# Patient Record
Sex: FEMALE | Race: WHITE | URBAN - METROPOLITAN AREA
[De-identification: names, ages, dates, MRNs, and addresses within clinical notes are randomized per-mention and may not be internally consistent; named-entity substitution may affect disease eponyms.]

---

## 2021-12-21 LAB
C. TRACHOMATIS, EXTERNAL RESULT: NEGATIVE
N. GONORRHOEAE, EXTERNAL RESULT: NEGATIVE

## 2022-01-17 LAB
ABO, EXTERNAL RESULT: NORMAL
GBS, EXTERNAL RESULT: NEGATIVE
HEP B, EXTERNAL RESULT: NEGATIVE
HIV, EXTERNAL RESULT: NEGATIVE
RH FACTOR, EXTERNAL RESULT: POSITIVE
RPR, EXTERNAL RESULT: NON REACTIVE
RUBELLA TITER, EXTERNAL RESULT: NORMAL

## 2022-08-15 ENCOUNTER — HOSPITAL ENCOUNTER (INPATIENT)
Age: 22
LOS: 2 days | Discharge: HOME | End: 2022-08-17
Attending: STUDENT IN AN ORGANIZED HEALTH CARE EDUCATION/TRAINING PROGRAM | Admitting: STUDENT IN AN ORGANIZED HEALTH CARE EDUCATION/TRAINING PROGRAM
Payer: COMMERCIAL

## 2022-08-15 ENCOUNTER — ANESTHESIA (OUTPATIENT)
Dept: LABOR AND DELIVERY | Age: 22
End: 2022-08-15
Payer: COMMERCIAL

## 2022-08-15 ENCOUNTER — ANESTHESIA EVENT (OUTPATIENT)
Dept: LABOR AND DELIVERY | Age: 22
End: 2022-08-15
Payer: COMMERCIAL

## 2022-08-15 PROBLEM — Z37.9 NORMAL LABOR: Status: ACTIVE | Noted: 2022-08-15

## 2022-08-15 LAB
A/G RATIO: 1.1 (ref 1.1–2.2)
ABO/RH: NORMAL
ALBUMIN SERPL-MCNC: 3.4 G/DL (ref 3.4–5)
ALP BLD-CCNC: 143 U/L (ref 40–129)
ALT SERPL-CCNC: <5 U/L (ref 10–40)
AMPHETAMINE SCREEN, URINE: NORMAL
ANION GAP SERPL CALCULATED.3IONS-SCNC: 13 MMOL/L (ref 3–16)
ANTIBODY SCREEN: NORMAL
APTT: 32.3 SEC (ref 23–34.3)
AST SERPL-CCNC: 11 U/L (ref 15–37)
BACTERIA: ABNORMAL /HPF
BARBITURATE SCREEN URINE: NORMAL
BASOPHILS ABSOLUTE: 0 K/UL (ref 0–0.2)
BASOPHILS RELATIVE PERCENT: 0.2 %
BENZODIAZEPINE SCREEN, URINE: NORMAL
BILIRUB SERPL-MCNC: <0.2 MG/DL (ref 0–1)
BILIRUBIN URINE: NEGATIVE
BLOOD, URINE: ABNORMAL
BUN BLDV-MCNC: 5 MG/DL (ref 7–20)
BUPRENORPHINE URINE: NORMAL
CALCIUM SERPL-MCNC: 10.4 MG/DL (ref 8.3–10.6)
CANNABINOID SCREEN URINE: NORMAL
CHLORIDE BLD-SCNC: 105 MMOL/L (ref 99–110)
CLARITY: CLEAR
CO2: 20 MMOL/L (ref 21–32)
COCAINE METABOLITE SCREEN URINE: NORMAL
COLOR: YELLOW
CREAT SERPL-MCNC: 0.6 MG/DL (ref 0.6–1.1)
CREATININE URINE: 41.7 MG/DL (ref 28–259)
EOSINOPHILS ABSOLUTE: 0.1 K/UL (ref 0–0.6)
EOSINOPHILS RELATIVE PERCENT: 0.4 %
EPITHELIAL CELLS, UA: ABNORMAL /HPF (ref 0–5)
FIBRINOGEN: 529 MG/DL (ref 207–509)
GFR AFRICAN AMERICAN: >60
GFR NON-AFRICAN AMERICAN: >60
GLUCOSE BLD-MCNC: 106 MG/DL (ref 70–99)
GLUCOSE URINE: NEGATIVE MG/DL
HCT VFR BLD CALC: 31.2 % (ref 36–48)
HEMOGLOBIN: 10.4 G/DL (ref 12–16)
INR BLD: 0.99 (ref 0.87–1.14)
KETONES, URINE: NEGATIVE MG/DL
LEUKOCYTE ESTERASE, URINE: NEGATIVE
LYMPHOCYTES ABSOLUTE: 1.9 K/UL (ref 1–5.1)
LYMPHOCYTES RELATIVE PERCENT: 12.4 %
Lab: NORMAL
MCH RBC QN AUTO: 26.8 PG (ref 26–34)
MCHC RBC AUTO-ENTMCNC: 33.3 G/DL (ref 31–36)
MCV RBC AUTO: 80.3 FL (ref 80–100)
METHADONE SCREEN, URINE: NORMAL
MICROSCOPIC EXAMINATION: YES
MONOCYTES ABSOLUTE: 1.1 K/UL (ref 0–1.3)
MONOCYTES RELATIVE PERCENT: 6.8 %
NEUTROPHILS ABSOLUTE: 12.6 K/UL (ref 1.7–7.7)
NEUTROPHILS RELATIVE PERCENT: 80.2 %
NITRITE, URINE: NEGATIVE
OPIATE SCREEN URINE: NORMAL
OXYCODONE URINE: NORMAL
PDW BLD-RTO: 14.3 % (ref 12.4–15.4)
PH UA: 7
PH UA: 7 (ref 5–8)
PHENCYCLIDINE SCREEN URINE: NORMAL
PLATELET # BLD: 263 K/UL (ref 135–450)
PMV BLD AUTO: 9 FL (ref 5–10.5)
POTASSIUM SERPL-SCNC: 3.7 MMOL/L (ref 3.5–5.1)
PROPOXYPHENE SCREEN: NORMAL
PROTEIN PROTEIN: 6 MG/DL
PROTEIN UA: NEGATIVE MG/DL
PROTEIN/CREAT RATIO: 0.1 MG/DL
PROTHROMBIN TIME: 12.9 SEC (ref 11.7–14.5)
RBC # BLD: 3.89 M/UL (ref 4–5.2)
RBC UA: ABNORMAL /HPF (ref 0–4)
SODIUM BLD-SCNC: 138 MMOL/L (ref 136–145)
SPECIFIC GRAVITY UA: 1.01 (ref 1–1.03)
TOTAL PROTEIN: 6.5 G/DL (ref 6.4–8.2)
TOTAL SYPHILLIS IGG/IGM: NORMAL
URIC ACID, SERUM: 6.2 MG/DL (ref 2.6–6)
URINE TYPE: ABNORMAL
UROBILINOGEN, URINE: 0.2 E.U./DL
WBC # BLD: 15.7 K/UL (ref 4–11)
WBC UA: ABNORMAL /HPF (ref 0–5)

## 2022-08-15 PROCEDURE — 86901 BLOOD TYPING SEROLOGIC RH(D): CPT

## 2022-08-15 PROCEDURE — 2580000003 HC RX 258: Performed by: STUDENT IN AN ORGANIZED HEALTH CARE EDUCATION/TRAINING PROGRAM

## 2022-08-15 PROCEDURE — 2500000003 HC RX 250 WO HCPCS: Performed by: STUDENT IN AN ORGANIZED HEALTH CARE EDUCATION/TRAINING PROGRAM

## 2022-08-15 PROCEDURE — 6360000002 HC RX W HCPCS: Performed by: STUDENT IN AN ORGANIZED HEALTH CARE EDUCATION/TRAINING PROGRAM

## 2022-08-15 PROCEDURE — 99231 SBSQ HOSP IP/OBS SF/LOW 25: CPT | Performed by: OBSTETRICS & GYNECOLOGY

## 2022-08-15 PROCEDURE — 1220000000 HC SEMI PRIVATE OB R&B

## 2022-08-15 PROCEDURE — 85610 PROTHROMBIN TIME: CPT

## 2022-08-15 PROCEDURE — 86900 BLOOD TYPING SEROLOGIC ABO: CPT

## 2022-08-15 PROCEDURE — 2500000003 HC RX 250 WO HCPCS: Performed by: NURSE ANESTHETIST, CERTIFIED REGISTERED

## 2022-08-15 PROCEDURE — 81001 URINALYSIS AUTO W/SCOPE: CPT

## 2022-08-15 PROCEDURE — 80053 COMPREHEN METABOLIC PANEL: CPT

## 2022-08-15 PROCEDURE — 85384 FIBRINOGEN ACTIVITY: CPT

## 2022-08-15 PROCEDURE — 86850 RBC ANTIBODY SCREEN: CPT

## 2022-08-15 PROCEDURE — 85730 THROMBOPLASTIN TIME PARTIAL: CPT

## 2022-08-15 PROCEDURE — 82570 ASSAY OF URINE CREATININE: CPT

## 2022-08-15 PROCEDURE — 86780 TREPONEMA PALLIDUM: CPT

## 2022-08-15 PROCEDURE — 84156 ASSAY OF PROTEIN URINE: CPT

## 2022-08-15 PROCEDURE — 85025 COMPLETE CBC W/AUTO DIFF WBC: CPT

## 2022-08-15 PROCEDURE — 80307 DRUG TEST PRSMV CHEM ANLYZR: CPT

## 2022-08-15 PROCEDURE — 84550 ASSAY OF BLOOD/URIC ACID: CPT

## 2022-08-15 RX ORDER — LIDOCAINE HYDROCHLORIDE 10 MG/ML
30 INJECTION, SOLUTION EPIDURAL; INFILTRATION; INTRACAUDAL; PERINEURAL PRN
Status: DISCONTINUED | OUTPATIENT
Start: 2022-08-15 | End: 2022-08-16

## 2022-08-15 RX ORDER — FERROUS SULFATE 325(65) MG
325 TABLET ORAL
COMMUNITY

## 2022-08-15 RX ORDER — DIPHENHYDRAMINE HYDROCHLORIDE 50 MG/ML
25 INJECTION INTRAMUSCULAR; INTRAVENOUS EVERY 4 HOURS PRN
Status: DISCONTINUED | OUTPATIENT
Start: 2022-08-15 | End: 2022-08-16

## 2022-08-15 RX ORDER — SODIUM CHLORIDE 0.9 % (FLUSH) 0.9 %
5-40 SYRINGE (ML) INJECTION EVERY 12 HOURS SCHEDULED
Status: DISCONTINUED | OUTPATIENT
Start: 2022-08-15 | End: 2022-08-16

## 2022-08-15 RX ORDER — SODIUM CHLORIDE, SODIUM LACTATE, POTASSIUM CHLORIDE, CALCIUM CHLORIDE 600; 310; 30; 20 MG/100ML; MG/100ML; MG/100ML; MG/100ML
INJECTION, SOLUTION INTRAVENOUS CONTINUOUS
Status: DISCONTINUED | OUTPATIENT
Start: 2022-08-15 | End: 2022-08-16

## 2022-08-15 RX ORDER — SODIUM CHLORIDE 0.9 % (FLUSH) 0.9 %
5-40 SYRINGE (ML) INJECTION PRN
Status: DISCONTINUED | OUTPATIENT
Start: 2022-08-15 | End: 2022-08-16

## 2022-08-15 RX ORDER — ONDANSETRON 2 MG/ML
4 INJECTION INTRAMUSCULAR; INTRAVENOUS EVERY 6 HOURS PRN
Status: DISCONTINUED | OUTPATIENT
Start: 2022-08-15 | End: 2022-08-16

## 2022-08-15 RX ORDER — ACETAMINOPHEN 325 MG/1
650 TABLET ORAL EVERY 4 HOURS PRN
Status: DISCONTINUED | OUTPATIENT
Start: 2022-08-15 | End: 2022-08-16

## 2022-08-15 RX ORDER — SODIUM CHLORIDE, SODIUM LACTATE, POTASSIUM CHLORIDE, AND CALCIUM CHLORIDE .6; .31; .03; .02 G/100ML; G/100ML; G/100ML; G/100ML
1000 INJECTION, SOLUTION INTRAVENOUS PRN
Status: DISCONTINUED | OUTPATIENT
Start: 2022-08-15 | End: 2022-08-16

## 2022-08-15 RX ORDER — SODIUM CHLORIDE 9 MG/ML
25 INJECTION, SOLUTION INTRAVENOUS PRN
Status: DISCONTINUED | OUTPATIENT
Start: 2022-08-15 | End: 2022-08-16

## 2022-08-15 RX ORDER — SODIUM CHLORIDE, SODIUM LACTATE, POTASSIUM CHLORIDE, AND CALCIUM CHLORIDE .6; .31; .03; .02 G/100ML; G/100ML; G/100ML; G/100ML
500 INJECTION, SOLUTION INTRAVENOUS PRN
Status: DISCONTINUED | OUTPATIENT
Start: 2022-08-15 | End: 2022-08-16

## 2022-08-15 RX ORDER — BUTORPHANOL TARTRATE 1 MG/ML
1 INJECTION, SOLUTION INTRAMUSCULAR; INTRAVENOUS
Status: DISCONTINUED | OUTPATIENT
Start: 2022-08-15 | End: 2022-08-16

## 2022-08-15 RX ORDER — BUPIVACAINE HYDROCHLORIDE 2.5 MG/ML
INJECTION, SOLUTION EPIDURAL; INFILTRATION; INTRACAUDAL PRN
Status: DISCONTINUED | OUTPATIENT
Start: 2022-08-15 | End: 2022-08-16 | Stop reason: SDUPTHER

## 2022-08-15 RX ORDER — FAMOTIDINE 20 MG/1
20 TABLET, FILM COATED ORAL 2 TIMES DAILY
COMMUNITY

## 2022-08-15 RX ORDER — FAMOTIDINE 10 MG/ML
20 INJECTION, SOLUTION INTRAVENOUS 2 TIMES DAILY
Status: DISCONTINUED | OUTPATIENT
Start: 2022-08-15 | End: 2022-08-16

## 2022-08-15 RX ADMIN — SODIUM CHLORIDE, POTASSIUM CHLORIDE, SODIUM LACTATE AND CALCIUM CHLORIDE: 600; 310; 30; 20 INJECTION, SOLUTION INTRAVENOUS at 11:18

## 2022-08-15 RX ADMIN — BUPIVACAINE HYDROCHLORIDE 1 ML: 2.5 INJECTION, SOLUTION EPIDURAL; INFILTRATION; INTRACAUDAL; PERINEURAL at 13:33

## 2022-08-15 RX ADMIN — Medication 1 MILLI-UNITS/MIN: at 14:00

## 2022-08-15 RX ADMIN — FAMOTIDINE 20 MG: 10 INJECTION, SOLUTION INTRAVENOUS at 16:53

## 2022-08-15 RX ADMIN — ONDANSETRON 4 MG: 2 INJECTION INTRAMUSCULAR; INTRAVENOUS at 18:04

## 2022-08-15 RX ADMIN — SODIUM CHLORIDE, POTASSIUM CHLORIDE, SODIUM LACTATE AND CALCIUM CHLORIDE: 600; 310; 30; 20 INJECTION, SOLUTION INTRAVENOUS at 13:50

## 2022-08-15 RX ADMIN — BUPIVACAINE HYDROCHLORIDE 1 ML: 2.5 INJECTION, SOLUTION EPIDURAL; INFILTRATION; INTRACAUDAL; PERINEURAL at 23:32

## 2022-08-15 RX ADMIN — Medication 3 ML/HR: at 13:53

## 2022-08-15 RX ADMIN — Medication 10 ML: at 11:17

## 2022-08-15 ASSESSMENT — LIFESTYLE VARIABLES: SMOKING_STATUS: 0

## 2022-08-15 NOTE — PROGRESS NOTES
Spoke with Dr. Corbin Rouse via telephone to update on patient status. Request house doc AROM patient at this time.

## 2022-08-15 NOTE — PLAN OF CARE
Problem: Pain  Goal: Verbalizes/displays adequate comfort level or baseline comfort level  Outcome: Progressing     Problem: Vaginal Birth or  Section  Goal: Fetal and maternal status remain reassuring during the birth process  Description:  Birth OB-Pregnancy care plan goal which identifies if the fetal and maternal status remain reassuring during the birth process  Outcome: Progressing     Problem: Infection - Adult  Goal: Absence of infection at discharge  Outcome: Progressing  Goal: Absence of fever/infection during anticipated neutropenic period  Outcome: Progressing

## 2022-08-15 NOTE — PROGRESS NOTES
Department of Obstetrics and Gynecology  Labor and Delivery   Attending Progress Note      SUBJECTIVE:  26 y/o  female at 40 weeks 2 days gestation with [facility] 22. Pregnancy is complicated by elevated blood pressure at term, anemia, and PCN allergy  Patient denies any complaints. S/P epidural.   CTSP at request of Dr. Doreen Haile for amniotomy. OBJECTIVE:      Vitals:    /71   Pulse (!) 126   Temp 98.4 °F (36.9 °C) (Oral)   Resp 16   Ht 5' 9\" (1.753 m)   Wt 245 lb (111.1 kg)   SpO2 98%   BMI 36.18 kg/m²       Fetal heart rate:       Baseline Heart Rate:  125        Accelerations:  present       Long Term Variability:  moderate       Decelerations:  absent         Contraction frequency: 3-5 minutes    Fetal Presentation:  Cephalic,     Membranes:  Ruptured meconium stained  Amniotomy performed for small amount of meconium stained fluid  Cervix:           Dilation:  3 cm         Effacement:  90         Station:  -1         Consistency:  soft         Position:  mid     Amniotomy performed.    Findings communicated with Primary OB  Expectant management

## 2022-08-15 NOTE — ANESTHESIA PRE PROCEDURE
Department of Anesthesiology  Preprocedure Note       Name:  Cosme Born   Age:  25 y.o.  :  2000                                          MRN:  [id number]         Date:  8/15/2022      Surgeon: * Surgery not found *    Procedure:     Medications prior to admission:   Prior to Admission medications    Medication Sig Start Date End Date Taking? Authorizing Provider   Prenatal MV-Min-Fe Fum-FA-DHA (PRENATAL 1 PO) Take by mouth   Yes Historical Provider, MD   ferrous sulfate (IRON 325) 325 (65 Fe) MG tablet Take 325 mg by mouth daily (with breakfast)   Yes Historical Provider, MD   famotidine (PEPCID) 20 MG tablet Take 20 mg by mouth in the morning and 20 mg before bedtime.    Yes Historical Provider, MD       Current medications:    Current Facility-Administered Medications   Medication Dose Route Frequency Provider Last Rate Last Admin    lactated ringers infusion   IntraVENous Continuous Rosa Marquez  mL/hr at 08/15/22 1350 New Bag at 08/15/22 1350    lactated ringers bolus  500 mL IntraVENous PRN Rosa Marquez MD        Or    lactated ringers bolus  1,000 mL IntraVENous PRN Rosa Marquez MD        sodium chloride flush 0.9 % injection 5-40 mL  5-40 mL IntraVENous 2 times per day Rosa Marquez MD        sodium chloride flush 0.9 % injection 5-40 mL  5-40 mL IntraVENous PRN Rosa Marquez MD   10 mL at 08/15/22 1117    0.9 % sodium chloride infusion  25 mL IntraVENous PRN Rosa Marquez MD        lidocaine PF 1 % injection 30 mL  30 mL Other PRN Rosa Marquez MD        ondansetron (ZOFRAN) injection 4 mg  4 mg IntraVENous Q6H PRN Rosa Marquez MD        diphenhydrAMINE (BENADRYL) injection 25 mg  25 mg IntraVENous Q4H PRN Rosa Marquez MD        acetaminophen (TYLENOL) tablet 650 mg  650 mg Oral Q4H PRN Rosa Marquez MD        butorphanol (STADOL) injection 1 mg  1 mg IntraVENous Q3H PRN Rosa Marquez MD        oxytocin (PITOCIN) 30 units in 500 mL infusion  1-20 bhumika-units/min IntraVENous Continuous Rosa Marquez MD 2 mL/hr at 08/15/22 1430 2 bhumika-units/min at 08/15/22 1430    sodium chloride 0.9 % 200 mL with fentaNYL 500 mcg, bupivacaine 0.5% 50 mL (OB) epidural                Allergies: Allergies   Allergen Reactions    Cefdinir Rash and Hives    Penicillins Rash and Hives       Problem List:    Patient Active Problem List   Diagnosis Code    Normal labor O80, Z37.9       Past Medical History:        Diagnosis Date    Anemia        Past Surgical History:  History reviewed. No pertinent surgical history. Social History:    Social History     Tobacco Use    Smoking status: Never    Smokeless tobacco: Never   Substance Use Topics    Alcohol use: Not Currently                                Counseling given: Not Answered      Vital Signs (Current):   Vitals:    08/15/22 1355 08/15/22 1358 08/15/22 1400 08/15/22 1430   BP: (!) 141/78 (!) 143/77 139/74 130/72   Pulse: (!) 111 (!) 105 (!) 106 (!) 110   Resp:   16 16   Temp:   36.9 °C (98.4 °F)    TempSrc:   Oral    SpO2:       Weight:       Height:                                                  BP Readings from Last 3 Encounters:   08/15/22 130/72       NPO Status:                                                                                 BMI:   Wt Readings from Last 3 Encounters:   08/15/22 245 lb (111.1 kg)     Body mass index is 36.18 kg/m².     CBC:   Lab Results   Component Value Date/Time    WBC 15.7 08/15/2022 08:35 AM    RBC 3.89 08/15/2022 08:35 AM    HGB 10.4 08/15/2022 08:35 AM    HCT 31.2 08/15/2022 08:35 AM    MCV 80.3 08/15/2022 08:35 AM    RDW 14.3 08/15/2022 08:35 AM     08/15/2022 08:35 AM       CMP:   Lab Results   Component Value Date/Time     08/15/2022 08:35 AM    K 3.7 08/15/2022 08:35 AM     08/15/2022 08:35 AM    CO2 20 08/15/2022 08:35 AM    BUN 5 08/15/2022 08:35 AM    CREATININE 0.6 08/15/2022 08:35 AM    GFRAA >60 08/15/2022 08:35 AM    AGRATIO 1.1 08/15/2022 08:35 AM    LABGLOM >60 08/15/2022 08:35 AM    GLUCOSE 106 08/15/2022 08:35 AM    PROT 6.5 08/15/2022 08:35 AM    CALCIUM 10.4 08/15/2022 08:35 AM    BILITOT <0.2 08/15/2022 08:35 AM    ALKPHOS 143 08/15/2022 08:35 AM    AST 11 08/15/2022 08:35 AM    ALT <5 08/15/2022 08:35 AM       POC Tests: No results for input(s): POCGLU, POCNA, POCK, POCCL, POCBUN, POCHEMO, POCHCT in the last 72 hours. Coags:   Lab Results   Component Value Date/Time    PROTIME 12.9 08/15/2022 08:35 AM    INR 0.99 08/15/2022 08:35 AM    APTT 32.3 08/15/2022 08:35 AM       HCG (If Applicable): No results found for: PREGTESTUR, PREGSERUM, HCG, HCGQUANT     ABGs: No results found for: PHART, PO2ART, HMZ2SPV, GVI5VWZ, BEART, T6ZSDZSO     Type & Screen (If Applicable):  No results found for: LABABO, LABRH    Drug/Infectious Status (If Applicable):  No results found for: HIV, HEPCAB    COVID-19 Screening (If Applicable): No results found for: COVID19        Anesthesia Evaluation  Patient summary reviewed and Nursing notes reviewed no history of anesthetic complications:   Airway: Mallampati: II  TM distance: >3 FB   Neck ROM: full  Mouth opening: > = 3 FB   Dental:          Pulmonary:Negative Pulmonary ROS       (-) not a current smoker                           Cardiovascular:  Exercise tolerance: good (>4 METS),       (-) hypertension                Neuro/Psych:   (+) depression/anxiety  (anxiety, panic attacks)            GI/Hepatic/Renal:   (+) GERD: no interval change,           Endo/Other:    (+) blood dyscrasia: anemia:., .                 Abdominal:             Vascular: Other Findings: Left nasal piercing, bilateral earrings in place          Anesthesia Plan      epidural     ASA 2 - emergent             Anesthetic plan and risks discussed with patient and mother (risks/benefits of NILSON discussed with patient, FOB and patient's mother. Procedure explained, questions answered, verbal consent obtained from patient. ).                         Criselda Aleman Aide Rust, APRN - CRNA   8/15/2022

## 2022-08-15 NOTE — PROGRESS NOTES
Dr. Divya Paulino (House Doc) at bedside at this time. Patient AROM for MSF. SVE 3/90/-1 per Dr. Divya Paulino.

## 2022-08-15 NOTE — H&P
Department of Obstetrics and Gynecology  Labor and Delivery  Attending Triage Note      SUBJECTIVE:  Pt. c/o contractions and leakage of fluid. First noted underwear were wet around 1am, went back to sleep and were wet again at 4am. Some contractions, +FM. +VB in mucous, no sintia blood. Otherwise no complaints. OB History    Para Term  AB Living   1             SAB IAB Ectopic Molar Multiple Live Births                    # Outcome Date GA Lbr Hernán/2nd Weight Sex Delivery Anes PTL Lv   1 Current              Past Medical History:   Diagnosis Date    Anemia      History reviewed. No pertinent surgical history. No current facility-administered medications on file prior to encounter. Current Outpatient Medications on File Prior to Encounter   Medication Sig Dispense Refill    Prenatal MV-Min-Fe Fum-FA-DHA (PRENATAL 1 PO) Take by mouth      ferrous sulfate (IRON 325) 325 (65 Fe) MG tablet Take 325 mg by mouth daily (with breakfast)      famotidine (PEPCID) 20 MG tablet Take 20 mg by mouth in the morning and 20 mg before bedtime. Allergies   Allergen Reactions    Cefdinir Rash    Penicillins Rash       OBJECTIVE    Vitals:  Vitals:    08/15/22 0708   BP: (!) 147/80   Pulse: (!) 120   Resp:    Temp:        Physical Exam  HENT:      Head: Normocephalic. Cardiovascular:      Rate and Rhythm: Normal rate. Pulmonary:      Effort: Pulmonary effort is normal. No respiratory distress. Abdominal:      Palpations: Abdomen is soft. Tenderness: There is no abdominal tenderness. Neurological:      Mental Status: She is alert. Psychiatric:         Mood and Affect: Mood normal.   SSE: negative pool/fern/valsalva    Cervix:  2/80/-2 per RN in triage    Membranes:  Intact    Fetal heart rate:  125 / moderate / + accel / -decel  Contraction frequency: q2-3 min      ASSESSMENT & PLAN:    Lelo Swift is a 25 y.o.  at 44+2 here for rule-out rupture and contractions    1.  Fetal Status: reassuring    2. Rule-out ROM  - exam negative for rupture  - SVE as above per RN in triage    Attending provider Dr. Lourdes Salguero updated regarding patient status. Final plan per her.     Priyanka Sweeney MD

## 2022-08-15 NOTE — ANESTHESIA PROCEDURE NOTES
CSE Block    Patient location during procedure: OB  Start time: 8/15/2022 1:26 PM  End time: 8/15/2022 1:53 PM  Reason for block: labor epidural  Staffing  Performed: resident/CRNA   Anesthesiologist: Acosta Chen MD  Resident/CRNA: Mojgan Pa APRN - CRNA  CSE  Patient position: sitting  Prep: ChloraPrep and site prepped and draped  Patient monitoring: continuous pulse ox and frequent blood pressure checks  Approach: midline  Provider prep: mask and sterile gloves  Spinal Needle  Needle type: pencil-tip   Needle gauge: 25 G  Needle length: 4.75 in  Epidural Needle  Injection technique: NICK saline  Needle type: Tuohy   Needle gauge: 17 G  Needle length: 3.5 in  Needle insertion depth: 7 cm  Location: lumbar (1-5)  Catheter  Catheter type: side hole  Catheter size: 19 G  Catheter at skin depth: 12 cm  Epidural test dose: deferred-positive for csf aspirate from catheter. Assessment  Events: cerebrospinal fluid  Hemodynamics: stable  Additional Notes  Patient in sitting position, sterile prep and drape applied to back. Skin localization with lidocaine 1% 5ml. LORT with NS at 7cm. CSE with 25g pencan- clear free flowing csf obtained. Spinal needle removed, epidural catheter advanced with mild resistance. + clear fluid obtained from cathteter aspiration. Test dose deferred as catheter appears to be intrathecal.  Sterile dressing applied. Explained suspicion of spinal catheter with patient, family and RN at bedside.         Preanesthetic Checklist  Completed: patient identified, IV checked, site marked, risks and benefits discussed, surgical/procedural consents, equipment checked, pre-op evaluation, timeout performed, anesthesia consent given, oxygen available and monitors applied/VS acknowledged

## 2022-08-15 NOTE — PROGRESS NOTES
Yossi CRNA at bedside for epidural placement at 1316. Epidural placed at 1333. Patient tolerated well.

## 2022-08-15 NOTE — PROGRESS NOTES
Dr. Leeanne Waller at pt bedside. Blood pressures reviewed. Plan of care reviewed with pt and fob. All questions answered. Pt to be admitted.  Orders received

## 2022-08-15 NOTE — H&P
Admission:  Medications Prior to Admission: Prenatal MV-Min-Fe Fum-FA-DHA (PRENATAL 1 PO), Take by mouth  ferrous sulfate (IRON 325) 325 (65 Fe) MG tablet, Take 325 mg by mouth daily (with breakfast)  famotidine (PEPCID) 20 MG tablet, Take 20 mg by mouth in the morning and 20 mg before bedtime. REVIEW OF SYSTEMS:    Denies fever, chills, dizziness, CP, SOB, N/V/D, constipation    PHYSICAL EXAM:  Vitals:    08/15/22 0653 08/15/22 0708 08/15/22 0739 08/15/22 0749   BP: 131/75 (!) 147/80 139/75 (!) 144/74   Pulse: (!) 127 (!) 120 (!) 118 (!) 125   Resp:   18    Temp:   98.4 °F (36.9 °C)    TempSrc:   Oral    SpO2:   98%    Weight:       Height:         General appearance:  awake, alert, cooperative, no apparent distress, and appears stated age  Neurologic:  Awake, alert, oriented to name, place and time.     Lungs:  No increased work of breathing, good air exchange  Abdomen:  Soft, non tender, gravid, consistent with her gestational age, EFW by Leopold's maneuver was 3800 g  Fetal heart rate:  140 bpm, moderate variability, +accels, - decels  Pelvis:  Adequate pelvis  Cervix: 2/80/-2  Contraction frequency:  3-4 minutes  Membranes:  Intact      ASSESSMENT AND PLAN:  24 yo  at 40w2d presents in latent labor, found to have elevated Bps    Labor: Admit, anticipate normal delivery, routine labor orders  Fetus: Reassuring  GBS: No  Other: IV hydration and antiemetics, R, B, A and possible complications discussed  PIH labs ordered    Franco Kumar MD

## 2022-08-15 NOTE — PROGRESS NOTES
Spoke with Dr. Stefano Murry via telephone to make her aware that patient pulse rate in the 130's. New order received for 500mL bolus. Will continue to monitor.

## 2022-08-16 PROCEDURE — 2500000003 HC RX 250 WO HCPCS: Performed by: STUDENT IN AN ORGANIZED HEALTH CARE EDUCATION/TRAINING PROGRAM

## 2022-08-16 PROCEDURE — 0DQR0ZZ REPAIR ANAL SPHINCTER, OPEN APPROACH: ICD-10-PCS | Performed by: STUDENT IN AN ORGANIZED HEALTH CARE EDUCATION/TRAINING PROGRAM

## 2022-08-16 PROCEDURE — 6370000000 HC RX 637 (ALT 250 FOR IP): Performed by: STUDENT IN AN ORGANIZED HEALTH CARE EDUCATION/TRAINING PROGRAM

## 2022-08-16 PROCEDURE — 2580000003 HC RX 258: Performed by: STUDENT IN AN ORGANIZED HEALTH CARE EDUCATION/TRAINING PROGRAM

## 2022-08-16 PROCEDURE — 1220000000 HC SEMI PRIVATE OB R&B

## 2022-08-16 PROCEDURE — 10907ZC DRAINAGE OF AMNIOTIC FLUID, THERAPEUTIC FROM PRODUCTS OF CONCEPTION, VIA NATURAL OR ARTIFICIAL OPENING: ICD-10-PCS | Performed by: STUDENT IN AN ORGANIZED HEALTH CARE EDUCATION/TRAINING PROGRAM

## 2022-08-16 PROCEDURE — 2580000003 HC RX 258: Performed by: OBSTETRICS & GYNECOLOGY

## 2022-08-16 PROCEDURE — 6360000002 HC RX W HCPCS: Performed by: NURSE ANESTHETIST, CERTIFIED REGISTERED

## 2022-08-16 PROCEDURE — 7200000001 HC VAGINAL DELIVERY

## 2022-08-16 RX ORDER — ONDANSETRON 8 MG/1
8 TABLET, ORALLY DISINTEGRATING ORAL EVERY 8 HOURS PRN
Status: DISCONTINUED | OUTPATIENT
Start: 2022-08-16 | End: 2022-08-17 | Stop reason: HOSPADM

## 2022-08-16 RX ORDER — MORPHINE SULFATE 0.5 MG/ML
INJECTION, SOLUTION EPIDURAL; INTRATHECAL; INTRAVENOUS
Status: DISPENSED
Start: 2022-08-16 | End: 2022-08-16

## 2022-08-16 RX ORDER — DOCUSATE SODIUM 100 MG/1
100 CAPSULE, LIQUID FILLED ORAL 2 TIMES DAILY
Status: DISCONTINUED | OUTPATIENT
Start: 2022-08-16 | End: 2022-08-16 | Stop reason: SDUPTHER

## 2022-08-16 RX ORDER — MISOPROSTOL 100 UG/1
800 TABLET ORAL PRN
Status: DISCONTINUED | OUTPATIENT
Start: 2022-08-16 | End: 2022-08-17 | Stop reason: HOSPADM

## 2022-08-16 RX ORDER — CLINDAMYCIN PHOSPHATE 900 MG/50ML
900 INJECTION INTRAVENOUS ONCE
Status: COMPLETED | OUTPATIENT
Start: 2022-08-16 | End: 2022-08-16

## 2022-08-16 RX ORDER — OXYCODONE HYDROCHLORIDE 5 MG/1
10 TABLET ORAL EVERY 4 HOURS PRN
Status: DISCONTINUED | OUTPATIENT
Start: 2022-08-16 | End: 2022-08-17 | Stop reason: HOSPADM

## 2022-08-16 RX ORDER — DOCUSATE SODIUM 100 MG/1
100 CAPSULE, LIQUID FILLED ORAL 2 TIMES DAILY
Status: DISCONTINUED | OUTPATIENT
Start: 2022-08-16 | End: 2022-08-17 | Stop reason: HOSPADM

## 2022-08-16 RX ORDER — LANOLIN 100 %
OINTMENT (GRAM) TOPICAL PRN
Status: DISCONTINUED | OUTPATIENT
Start: 2022-08-16 | End: 2022-08-17 | Stop reason: HOSPADM

## 2022-08-16 RX ORDER — FERROUS SULFATE 325(65) MG
325 TABLET ORAL 2 TIMES DAILY WITH MEALS
Status: DISCONTINUED | OUTPATIENT
Start: 2022-08-16 | End: 2022-08-17 | Stop reason: HOSPADM

## 2022-08-16 RX ORDER — SODIUM CHLORIDE, SODIUM LACTATE, POTASSIUM CHLORIDE, CALCIUM CHLORIDE 600; 310; 30; 20 MG/100ML; MG/100ML; MG/100ML; MG/100ML
INJECTION, SOLUTION INTRAVENOUS CONTINUOUS
Status: DISCONTINUED | OUTPATIENT
Start: 2022-08-16 | End: 2022-08-17 | Stop reason: HOSPADM

## 2022-08-16 RX ORDER — MORPHINE SULFATE 10 MG/ML
INJECTION, SOLUTION INTRAMUSCULAR; INTRAVENOUS PRN
Status: DISCONTINUED | OUTPATIENT
Start: 2022-08-16 | End: 2022-08-16 | Stop reason: SDUPTHER

## 2022-08-16 RX ORDER — SODIUM CHLORIDE, SODIUM LACTATE, POTASSIUM CHLORIDE, AND CALCIUM CHLORIDE .6; .31; .03; .02 G/100ML; G/100ML; G/100ML; G/100ML
500 INJECTION, SOLUTION INTRAVENOUS ONCE
Status: COMPLETED | OUTPATIENT
Start: 2022-08-16 | End: 2022-08-16

## 2022-08-16 RX ORDER — OXYCODONE HYDROCHLORIDE 5 MG/1
5 TABLET ORAL EVERY 4 HOURS PRN
Status: DISCONTINUED | OUTPATIENT
Start: 2022-08-16 | End: 2022-08-17 | Stop reason: HOSPADM

## 2022-08-16 RX ORDER — CARBOPROST TROMETHAMINE 250 UG/ML
250 INJECTION, SOLUTION INTRAMUSCULAR PRN
Status: DISCONTINUED | OUTPATIENT
Start: 2022-08-16 | End: 2022-08-17 | Stop reason: HOSPADM

## 2022-08-16 RX ORDER — ACETAMINOPHEN 500 MG
1000 TABLET ORAL EVERY 8 HOURS
Status: DISCONTINUED | OUTPATIENT
Start: 2022-08-16 | End: 2022-08-17 | Stop reason: HOSPADM

## 2022-08-16 RX ORDER — IBUPROFEN 800 MG/1
800 TABLET ORAL EVERY 8 HOURS
Status: DISCONTINUED | OUTPATIENT
Start: 2022-08-16 | End: 2022-08-17 | Stop reason: HOSPADM

## 2022-08-16 RX ORDER — SODIUM CHLORIDE 0.9 % (FLUSH) 0.9 %
5-40 SYRINGE (ML) INJECTION PRN
Status: DISCONTINUED | OUTPATIENT
Start: 2022-08-16 | End: 2022-08-17 | Stop reason: HOSPADM

## 2022-08-16 RX ORDER — SODIUM CHLORIDE 9 MG/ML
INJECTION, SOLUTION INTRAVENOUS PRN
Status: DISCONTINUED | OUTPATIENT
Start: 2022-08-16 | End: 2022-08-17 | Stop reason: HOSPADM

## 2022-08-16 RX ORDER — SODIUM CHLORIDE 0.9 % (FLUSH) 0.9 %
5-40 SYRINGE (ML) INJECTION EVERY 12 HOURS SCHEDULED
Status: DISCONTINUED | OUTPATIENT
Start: 2022-08-16 | End: 2022-08-17 | Stop reason: HOSPADM

## 2022-08-16 RX ADMIN — Medication 10 ML: at 09:00

## 2022-08-16 RX ADMIN — DOCUSATE SODIUM 100 MG: 100 CAPSULE, LIQUID FILLED ORAL at 08:19

## 2022-08-16 RX ADMIN — MORPHINE SULFATE 0.2 MG: 10 INJECTION, SOLUTION INTRAMUSCULAR; INTRAVENOUS at 03:55

## 2022-08-16 RX ADMIN — IBUPROFEN 800 MG: 800 TABLET, FILM COATED ORAL at 10:46

## 2022-08-16 RX ADMIN — DOCUSATE SODIUM 100 MG: 100 CAPSULE, LIQUID FILLED ORAL at 21:11

## 2022-08-16 RX ADMIN — CLINDAMYCIN PHOSPHATE 900 MG: 900 INJECTION, SOLUTION INTRAVENOUS at 06:52

## 2022-08-16 RX ADMIN — SODIUM CHLORIDE, POTASSIUM CHLORIDE, SODIUM LACTATE AND CALCIUM CHLORIDE 500 ML: 600; 310; 30; 20 INJECTION, SOLUTION INTRAVENOUS at 17:38

## 2022-08-16 ASSESSMENT — PAIN SCALES - GENERAL: PAINLEVEL_OUTOF10: 4

## 2022-08-16 ASSESSMENT — PAIN DESCRIPTION - LOCATION: LOCATION: PERINEUM

## 2022-08-16 ASSESSMENT — PAIN DESCRIPTION - DESCRIPTORS: DESCRIPTORS: SORE

## 2022-08-16 ASSESSMENT — PAIN - FUNCTIONAL ASSESSMENT: PAIN_FUNCTIONAL_ASSESSMENT: ACTIVITIES ARE NOT PREVENTED

## 2022-08-16 NOTE — FLOWSHEET NOTE
Spoke with Dr. Lourdes Salguero via phone, updated on maternal -140, order for another 500ml bolus.  Also will be enroute to hospital as cervix is 10/100/+2

## 2022-08-16 NOTE — L&D DELIVERY NOTE
[facility]   Vaginal Delivery Note  Preoperative Diagnosis:  Latent labor  SIUP at 40w3d  Elevated BP    Postoperative Diagnosis: same s/p      Surgeon: Dr. William Champion    Anesthesia:  epidural anesthesia    Estimated blood loss:  350ml    Specimen:  Placenta not sent to pathology     Cord blood sent No    Complications:  3rd (3C) degree perineal laceration    Condition:  mother and infant stable in delivery room    Details of Procedure: The patient is a 25 y.o. female at 44w3d   OB History          1    Para        Term                AB        Living             SAB        IAB        Ectopic        Molar        Multiple        Live Births                 who was admitted for early latent labor. She received the following interventions: ARBOW and IV Pitocin augmentation She was known to be GBS negative and did not receive antibiotic prophylaxis. The patient progressed well,did receive an epidural, became complete and started to push. After pushing for 2 and 1/2 hours, the fetal head was at the perineum. With another push the rest of the infant delivered atraumatically, was placed on mother's abdomen where the infant was dried, stimulated, and bulb suctioned. Cord was clamped and cut and infant handed off to the waiting nurse for evaluation. The delivery of the placenta was spontaneous and intact. The perineum and vagina were explored and a third (3C) degree laceration was visualized. The rectal mucosa was evaluated and found to be intact. The internal and external anal sphincter was repaired under epidural anesthesia with interrupted stitches of 3-0 Vicryl. The remaining part of the laceration was repaired with a running stitch of 3-0 Vicryl. A right vaginal wall laceration was repaired with a running stitch of 3-0 Vicryl. Patient was given duramorph. Mother and infant are in the recovery room stable performing skin to skin.     Poli Ogden MD

## 2022-08-16 NOTE — PROGRESS NOTES
Department of Obstetrics and Gynecology  Labor and Delivery  Attending Post Partum Progress Note      SUBJECTIVE:  pt without complaints, lochia=menses    OBJECTIVE:      Vitals:  /61   Pulse (!) 129   Temp 98 °F (36.7 °C) (Oral)   Resp 18   Ht 5' 9\" (1.753 m)   Wt 245 lb (111.1 kg)   SpO2 99%   Breastfeeding Unknown   BMI 36.18 kg/m²     ABDOMEN:  No scars, normal bowel sounds, soft, non-distended, non-tender, no masses palpated, no hepatosplenomegally  GENITAL/URINARY:  deferred    DATA:    CBC:    Lab Results   Component Value Date/Time    WBC 15.7 08/15/2022 08:35 AM    RBC 3.89 08/15/2022 08:35 AM    HGB 10.4 08/15/2022 08:35 AM    HCT 31.2 08/15/2022 08:35 AM    MCV 80.3 08/15/2022 08:35 AM    RDW 14.3 08/15/2022 08:35 AM     08/15/2022 08:35 AM       ASSESSMENT & PLAN:      A: PPD #1 s/p     P: cont nl pp care

## 2022-08-16 NOTE — LACTATION NOTE
This note was copied from a baby's chart. Lactation Progress Note      Data:     Lactation consult for primip breast feeder who delivered early this am. Mob states that baby has been feeding well. Baby is LGA and blood sugars have been WNL. Action: Assisted with good position skin to skin at breast. Mob expressed colostrum to encourage feed. Baby rooting and a good deep latch achieved after few attempts. Breast feeding education initiated. Encouraged to allow baby to go to breast ad vikash and stressed the importance of always achieving a good deep latch. Offered f/u LC support prn. Discouraged paci, bottles and pumping for the first few weeks. Encouraged good hydration and nutrition. [facility] number on board for f/u. Response: Pleased with feed. Verbalized and demonstrated understanding.

## 2022-08-16 NOTE — FLOWSHEET NOTE
Dr. Javier Sweeney aware of increased maternal HR for the duration of this shift. No new orders at this time. Patient denies feeling SOB, pt remains afebrile.

## 2022-08-16 NOTE — L&D DELIVERY SUMMARY NOTE
[address] [address]                            LABOR AND DELIVERY NOTE    PATIENT NAME: Cassie Agustin                       :        2000  MED REC NO:   [id number]                          ROOM:       [id number]  ACCOUNT NO:   [de-identified]                           ADMIT DATE: 08/15/2022  PROVIDER:     Terrie Roberts MD    DATE OF PROCEDURE:  2022    VAGINAL DELIVERY NOTE    PREOPERATIVE DIAGNOSES:  Latent labor, SIUP at 40 weeks and 3 days,  elevated blood pressure. POSTOPERATIVE DIAGNOSES:  Latent labor, SIUP at 40 weeks and 3 days,  elevated blood pressure, status post . SURGEON:  Terrie Roberts MD    ANESTHESIA:  Epidural anesthesia. ESTIMATED BLOOD LOSS:  350 mL. COMPLICATIONS:  Third 3C degree perineal laceration. SPECIMENS:  Placenta not sent to Pathology. Cord blood sent, no.    CONDITION:  Mother and infant stable in delivery room. DETAILED OF PROCEDURE:  The patient is a 78-year-old G1, P0 at 40 weeks  and 3 days, who was admitted for early latent labor. She received the  following interventions; ARBOW, and IV Pitocin augmentation. She was  noted to be GBS negative and did not receive antibiotic prophylaxis. The patient progressed well, did receive an epidural, began to complete  and started to push. After pushing for 2.5 hours, fetal head was at the  perineum. With another push, the rest of the infant delivered  atraumatically and was placed on mom's abdomen, where the infant was  dried, stimulated, and bulb suctioned. Cord was clamped and cut, and  the infant handed off to the awaiting nurse for evaluation. The  delivery of the placenta was spontaneous and intact. The perineum and  vagina were explored and a third 3C degree laceration was visualized. The rectal mucosa was evaluated and found to be intact.   The internal  and external anal sphincters were repaired under epidural anesthesia  with interrupted stitches of 3-0 Vicryl and the remaining part of the  laceration was repaired with running stitch of 3-0 Vicryl. A right  vaginal wall laceration was repaired with a running stitch of 3-0  Vicryl. The patient was given Duramorph afterwards. Mother and infant  are in the recovery room stable performing skin-to-skin.         Janelle Roper MD    D: 08/16/2022 4:21:18       T: 08/16/2022 5:20:14     [id number]  Job#: [id number]     Doc#: [id number]    CC:

## 2022-08-17 VITALS
DIASTOLIC BLOOD PRESSURE: 61 MMHG | WEIGHT: 245 LBS | HEART RATE: 109 BPM | BODY MASS INDEX: 36.29 KG/M2 | TEMPERATURE: 98.5 F | HEIGHT: 69 IN | OXYGEN SATURATION: 99 % | RESPIRATION RATE: 16 BRPM | SYSTOLIC BLOOD PRESSURE: 131 MMHG

## 2022-08-17 LAB
BASOPHILS ABSOLUTE: 0 K/UL (ref 0–0.2)
BASOPHILS RELATIVE PERCENT: 0.2 %
EOSINOPHILS ABSOLUTE: 0.1 K/UL (ref 0–0.6)
EOSINOPHILS RELATIVE PERCENT: 0.8 %
HCT VFR BLD CALC: 24.7 % (ref 36–48)
HEMOGLOBIN: 8.2 G/DL (ref 12–16)
LYMPHOCYTES ABSOLUTE: 2.4 K/UL (ref 1–5.1)
LYMPHOCYTES RELATIVE PERCENT: 13.5 %
MCH RBC QN AUTO: 27.1 PG (ref 26–34)
MCHC RBC AUTO-ENTMCNC: 33.4 G/DL (ref 31–36)
MCV RBC AUTO: 81.2 FL (ref 80–100)
MONOCYTES ABSOLUTE: 1 K/UL (ref 0–1.3)
MONOCYTES RELATIVE PERCENT: 5.7 %
NEUTROPHILS ABSOLUTE: 14.3 K/UL (ref 1.7–7.7)
NEUTROPHILS RELATIVE PERCENT: 79.8 %
PDW BLD-RTO: 14.8 % (ref 12.4–15.4)
PLATELET # BLD: 210 K/UL (ref 135–450)
PMV BLD AUTO: 8.8 FL (ref 5–10.5)
RBC # BLD: 3.04 M/UL (ref 4–5.2)
WBC # BLD: 17.9 K/UL (ref 4–11)

## 2022-08-17 PROCEDURE — 6370000000 HC RX 637 (ALT 250 FOR IP): Performed by: OBSTETRICS & GYNECOLOGY

## 2022-08-17 PROCEDURE — 36415 COLL VENOUS BLD VENIPUNCTURE: CPT

## 2022-08-17 PROCEDURE — 6370000000 HC RX 637 (ALT 250 FOR IP): Performed by: STUDENT IN AN ORGANIZED HEALTH CARE EDUCATION/TRAINING PROGRAM

## 2022-08-17 PROCEDURE — 85025 COMPLETE CBC W/AUTO DIFF WBC: CPT

## 2022-08-17 RX ORDER — IBUPROFEN 800 MG/1
800 TABLET ORAL EVERY 8 HOURS PRN
Qty: 25 TABLET | Refills: 1 | Status: SHIPPED | OUTPATIENT
Start: 2022-08-17

## 2022-08-17 RX ADMIN — ACETAMINOPHEN 1000 MG: 500 TABLET ORAL at 16:02

## 2022-08-17 RX ADMIN — ACETAMINOPHEN 1000 MG: 500 TABLET ORAL at 07:53

## 2022-08-17 RX ADMIN — IBUPROFEN 800 MG: 800 TABLET, FILM COATED ORAL at 02:31

## 2022-08-17 RX ADMIN — FERROUS SULFATE TAB 325 MG (65 MG ELEMENTAL FE) 325 MG: 325 (65 FE) TAB at 07:53

## 2022-08-17 RX ADMIN — FERROUS SULFATE TAB 325 MG (65 MG ELEMENTAL FE) 325 MG: 325 (65 FE) TAB at 16:01

## 2022-08-17 RX ADMIN — WITCH HAZEL 40 EACH: 500 SOLUTION RECTAL; TOPICAL at 09:56

## 2022-08-17 RX ADMIN — DOCUSATE SODIUM 100 MG: 100 CAPSULE, LIQUID FILLED ORAL at 07:53

## 2022-08-17 RX ADMIN — IBUPROFEN 800 MG: 800 TABLET, FILM COATED ORAL at 12:10

## 2022-08-17 ASSESSMENT — PAIN DESCRIPTION - DESCRIPTORS: DESCRIPTORS: DISCOMFORT

## 2022-08-17 ASSESSMENT — PAIN SCALES - GENERAL
PAINLEVEL_OUTOF10: 1
PAINLEVEL_OUTOF10: 2
PAINLEVEL_OUTOF10: 2
PAINLEVEL_OUTOF10: 6
PAINLEVEL_OUTOF10: 4

## 2022-08-17 ASSESSMENT — PAIN DESCRIPTION - LOCATION
LOCATION: ABDOMEN;VAGINA
LOCATION: PERINEUM
LOCATION: ABDOMEN;VAGINA

## 2022-08-17 NOTE — PLAN OF CARE
Problem: Pain  Goal: Verbalizes/displays adequate comfort level or baseline comfort level  2022 0949 by Edi Morelos RN  Outcome: Progressing  Flowsheets (Taken 2022 0902)  Verbalizes/displays adequate comfort level or baseline comfort level:   Encourage patient to monitor pain and request assistance   Assess pain using appropriate pain scale   Administer analgesics based on type and severity of pain and evaluate response   Implement non-pharmacological measures as appropriate and evaluate response   Consider cultural and social influences on pain and pain management   Notify Licensed Independent Practitioner if interventions unsuccessful or patient reports new pain  2022 by Eligio Barrett RN  Outcome: Progressing  Flowsheets (Taken 2022 1243 by Soraya Turner RN)  Verbalizes/displays adequate comfort level or baseline comfort level:   Encourage patient to monitor pain and request assistance   Assess pain using appropriate pain scale   Administer analgesics based on type and severity of pain and evaluate response   Implement non-pharmacological measures as appropriate and evaluate response   Consider cultural and social influences on pain and pain management   Notify Licensed Independent Practitioner if interventions unsuccessful or patient reports new pain     Problem: Vaginal Birth or  Section  Goal: Fetal and maternal status remain reassuring during the birth process  Description:  Birth OB-Pregnancy care plan goal which identifies if the fetal and maternal status remain reassuring during the birth process  2022 0949 by Edi Morelos RN  Outcome: Progressing  2022 by Eligio Barrett RN  Outcome: Progressing     Problem: Infection - Adult  Goal: Absence of infection at discharge  2022 0949 by Edi Morelos RN  Outcome: Progressing  2022 by Eligio Barrett RN  Outcome: Progressing  Goal: Absence of fever/infection during anticipated neutropenic period  8/17/2022 0949 by Rashid Jacinto RN  Outcome: Progressing  8/16/2022 2136 by Tila Hanna RN  Outcome: Progressing     Problem: Safety - Adult  Goal: Free from fall injury  8/17/2022 0949 by Rashid Jacinto RN  Outcome: Progressing  8/16/2022 2136 by Tila Hanna RN  Outcome: Progressing     Problem: Discharge Planning  Goal: Discharge to home or other facility with appropriate resources  8/17/2022 0949 by Rashid Jacinto RN  Outcome: Progressing  8/16/2022 2136 by Tila Hanna RN  Outcome: Progressing     Problem: Chronic Conditions and Co-morbidities  Goal: Patient's chronic conditions and co-morbidity symptoms are monitored and maintained or improved  8/17/2022 0949 by Rashid Jacinto RN  Outcome: Progressing  8/16/2022 2136 by Tila Hanna RN  Outcome: Progressing     Problem: Skin/Tissue Integrity - Adult  Goal: Incisions, wounds, or drain sites healing without S/S of infection  8/17/2022 0949 by Rashid Jacinto RN  Outcome: Progressing  Flowsheets (Taken 8/17/2022 0902)  Incisions, Wounds, or Drain Sites Healing Without Sign and Symptoms of Infection:   ADMISSION and DAILY: Assess and document risk factors for pressure ulcer development   TWICE DAILY: Assess and document skin integrity   TWICE DAILY: Assess and document dressing/incision, wound bed, drain sites and surrounding tissue   Implement wound care per orders  8/16/2022 2136 by Tila Hanna RN  Outcome: Progressing     Problem: Genitourinary - Adult  Goal: Absence of urinary retention  8/17/2022 0949 by Rashid Jacinto RN  Outcome: Progressing  8/16/2022 2136 by Tila Hanna RN  Outcome: Progressing  Goal: Urinary catheter remains patent  8/17/2022 0949 by Rashid Jacinto RN  Outcome: Progressing  8/16/2022 2136 by Tila Hanna RN  Outcome: Progressing

## 2022-08-17 NOTE — PROGRESS NOTES
[facility] and Delivery   Post Partum Progress Note      SUBJECTIVE:  PPD 1 1/2. Feels well. OBJECTIVE:      Vitals:  Vitals:    22 0257   BP:    Pulse: (!) 108   Resp:    Temp:    SpO2:         Fundus firm, normal lochia  Extremities normal      DATA:    CBC:    Lab Results   Component Value Date/Time    WBC 17.9 2022 06:49 AM    RBC 3.04 2022 06:49 AM    HGB 8.2 2022 06:49 AM    HCT 24.7 2022 06:49 AM    MCV 81.2 2022 06:49 AM    RDW 14.8 2022 06:49 AM     2022 06:49 AM       ASSESSMENT & PLAN:      PPD  1 1/2 s/p   PP care  Fe with anemia during pregnancy.     Teo Escoto

## 2022-08-17 NOTE — PLAN OF CARE
Problem: Pain  Goal: Verbalizes/displays adequate comfort level or baseline comfort level  Outcome: Progressing  Flowsheets (Taken 2022 1243 by Armando Braber RN)  Verbalizes/displays adequate comfort level or baseline comfort level:   Encourage patient to monitor pain and request assistance   Assess pain using appropriate pain scale   Administer analgesics based on type and severity of pain and evaluate response   Implement non-pharmacological measures as appropriate and evaluate response   Consider cultural and social influences on pain and pain management   Notify Licensed Independent Practitioner if interventions unsuccessful or patient reports new pain     Problem: Vaginal Birth or  Section  Goal: Fetal and maternal status remain reassuring during the birth process  Description:  Birth OB-Pregnancy care plan goal which identifies if the fetal and maternal status remain reassuring during the birth process  Outcome: Progressing     Problem: Infection - Adult  Goal: Absence of infection at discharge  Outcome: Progressing  Goal: Absence of fever/infection during anticipated neutropenic period  Outcome: Progressing     Problem: Safety - Adult  Goal: Free from fall injury  Outcome: Progressing     Problem: Discharge Planning  Goal: Discharge to home or other facility with appropriate resources  Outcome: Progressing     Problem: Chronic Conditions and Co-morbidities  Goal: Patient's chronic conditions and co-morbidity symptoms are monitored and maintained or improved  Outcome: Progressing     Problem: Skin/Tissue Integrity - Adult  Goal: Incisions, wounds, or drain sites healing without S/S of infection  Outcome: Progressing     Problem: Genitourinary - Adult  Goal: Absence of urinary retention  Outcome: Progressing  Goal: Urinary catheter remains patent  Outcome: Progressing

## 2022-08-17 NOTE — LACTATION NOTE
This note was copied from a baby's chart. Lactation Progress Note      Data:    Follow up consult & discharge teaching for primip on day 1 pp with a LGA infant born at 40.3 weeks gestation. MOB reports breast feeding is going well & she is now able to independently latch infant at both breasts. Feeding Method Used: Breastfeeding. Primipara mom. [address] [facility] consulted. Documented feeding time of 90/102min. Urine output: established  Stool output: established  Percent weight change from birth:  -1%    Action:    Reviewed breast feeding education with mother; what to expect with infant feedings, infant output, how to know infant is getting enough, the importance of a deep latch, how to break suction and try again if latch is shallow, normal  behavior, how the breasts work to make milk, protecting milk supply, breastfeeding recommendations for exclusivity and duration, and breast care. Reviewed infant feeding cues and encouraged mother to allow infant to breast feed on demand anytime feeding cues are shown; a minimum of 8-12 times a day per 24 hour period. All questions answered. Mother instructed to call lactation outpatient resources for F/U care as needed. Response:    MOB verbalized an understanding of education provided and will call for assistance as needed.

## 2022-08-17 NOTE — ANESTHESIA POSTPROCEDURE EVALUATION
Department of Anesthesiology  Postprocedure Note    Patient: Essence Pang  MRN: [id number]  YOB: 2000  Date of evaluation: 8/17/2022      Procedure Summary     Date: 08/15/22 Room / Location:     Anesthesia Start: 1316 Anesthesia Stop: 08/16/22 0216    Procedure: Labor Analgesia Diagnosis:     Scheduled Providers:  Responsible Provider: Dione Saha MD    Anesthesia Type: epidural ASA Status: 2 - Emergent          Anesthesia Type: No value filed.     Isidoro Phase I: Isidoro Score: 9    Isidoro Phase II: Isidoro Score: 9      Anesthesia Post Evaluation    Patient location during evaluation: bedside  Patient participation: complete - patient participated  Level of consciousness: awake and alert  Nausea & Vomiting: no nausea and no vomiting  Complications: no  Cardiovascular status: hemodynamically stable  Hydration status: stable

## 2022-08-17 NOTE — DISCHARGE SUMMARY
Obstetrical Discharge Form    Gestational Age:40w3d    Antepartum complications: anemia    Date of Delivery: 22    Type of Delivery: vaginal, spontaneous    Delivered By:  Clint [place]:   Information for the patient's :  Pfau, Baby Boy Harsha Jeffries [[id number]]   Birthweight: 9 lb 14.8 oz (4.501 kg)   Anesthesia: Epidural    Intrapartum complications: None    Postpartum complications: anemia    Condition:good    Discharge Medication:      Medication List        ASK your doctor about these medications      famotidine 20 MG tablet  Commonly known as: PEPCID     ferrous sulfate 325 (65 Fe) MG tablet  Commonly known as: IRON 325     PRENATAL 1 PO           Motrin    Discharge Date: 22    Plan:   Follow up in 6 week(s)

## 2022-08-17 NOTE — DISCHARGE INSTRUCTIONS
Thank you for the opportunity to care for you and your family. We hope that you are happy with the care we provided during your stay in the [facility]. We want to ensure that you have the help you need when you leave the hospital. If there is anything we can assist you with, please let us know. Breastfeeding mothers may contact our lactation specialists with any problems or questions. [facility] lactation services phone number is [phone]. Leave a message and your call will be returned. Please refer to the information provided in the \"Caring for Yourself\" tab in your discharge binder (Guidelines for [employer]). The following are warning signs to remember. Call 911 if you have:    Chest pain or pressure  Shortness of breath, even at rest  Thoughts of harming yourself or your baby  Seizures    Call your healthcare provider if you have:    Temperature of 100.4 degrees or higher  Stitches that are not healing        -- Swelling, bleeding, drainage, foul odor, redness or warmth in/around your           stitches, staples, or incision (scar)        -- Bad smelling blood or discharge from the vagina  Vaginal bleeding that has increased         -- Soaking through one pad in an hour        -- You are passing clots larger than the size of a lemon  Red, warm tender area(s) in your breast or calf  Headache that does not get better, even after taking medicine; or headache with vision changes    Remember to notify all healthcare providers from your date of delivery to up to one year after giving birth! CARING FOR YOURSELF        DIET/ACTIVITY    Eat a well balanced diet focusing on foods high in fiber and protein. Drink plenty of fluids, especially water. To avoid constipation you may take a mild stool softener as recommended by your doctor or midwife. Gradually increase your activity. Resume an exercise regime only after being advised by your doctor or midwife.   When sitting or lying down, keep your legs elevated to reduce swelling. Avoid lifting anything heavier than your baby or a gallon of milk. Avoid driving for two weeks or while taking narcotics. No sexual intercourse for 6 weeks, or until advised by your OB provider. Nothing in the vagina: intercourse, tampons or douching. Be prepared to discuss family planning at your follow up OB visit. If your feel tired and have a lack of energy, you may continue to take your prenatal vitamins. Nap when your baby naps to catch on sleep. EMOTIONS    You may feel carson, sad, teary and overwhelmed. Contact your OB provider if you think you may be showing signs of postpartum depression. Please refer to the Micky 1898 tab in your discharge binder. If your baby will not stop crying, contact another adult to help or place the baby in its crib on its back and take a break. Never shake your baby! TATO CARE     Vaginal bleeding will decrease in amount over the next few weeks. Cleanse your perineum from front to back using the tato-bottle after toileting until bleeding stops. You will notice that as your activity increases, your flow may also increase. This is a sign that you need to rest more often. Call your OB provider if you are saturating more than 1 maxi pad an hour and resting does not help. If used, stiches will dissolve in 4-6 weeks. To ease pain or swelling, use prescribed medications properly or use a sitz bath, if ordered. Kegel exercises will help to restore bladder control. BREAST CARE    FOR BREASTFEEDING MOMS:    If you become engorged, feeding may be more difficult or painful in 1 to 2 days. You may find it helpful to hand express some milk so that the infant can latch on more easily. While breastfeeding continue to take your prenatal vitamins as directed. Refer to the breastfeeding information in the discharge binder.       FOR NON-BREASTFEEDING MOMS:    You may apply ice packs to your breasts over your bra for 20 minutes at a time for comfort. Do not express breast milk. Avoid stimulation to your breasts. When showering, allow the water to strike only your back. Wear a good fitting bra, such as a sports bra, until your milk dries up    [address]    Your abdomen is tender to the touch or you have pain that cannot be relieved. Flu-like symptoms such as achy muscles and joints or you are experiencing extreme weakness or dizziness. Persistent burning or increased urgency in urination. LITERATURE PROVIDED    For Moms and Those Who Care About Them  Your [employer] Healthy Start, Grow Smart  Breastfeeding Best for [place], [place] for Mom. [address] Information About [employer]  Controlling pain. I have received the educational material listed above. The  Channel has provided me with the opportunity to view instructional videos pertaining to infant care and the care of myself. I verify that I have received the above information and have no further questions and feel confident to care for myself and my baby. For more information about postpartum care, baby care and feeding, create a [facility] My Chart account, sign in and search using the magnifying glass, typing in postpartum, breast feeding or formula feeding. [email or web address]

## 2022-08-17 NOTE — PROGRESS NOTES
Discharge Phone Call    Patient Name: Karen Osei     [facility] Care Provider: Fracno Kumar MD Discharge Date: 2022    Disposition of baby:    Phone Number: [phone] (home)     Attempts to Contact:  Date:    Caller  Date:    Caller  Date:    Caller    Information for the patient's :  Damion Palacio [[id number]]   Delivery Method: Vaginal, Spontaneous     1. Now that you are at home is your pain being well controlled? Y/N   If no, instruct to call       provider. 2. Are you breastfeeding? Y/N    Do you need any extra support from our lactation staff? Y/N    If yes, provide number for lactation. 3. Have you made or already had your first appointment with the baby's doctor? Y/N   If no, do      you know when to schedule it? Y/N    4. Have you scheduled your follow-up appointment? Y/N  If no, do you know when to schedule       it? Y/N   If no, they can find it on printed discharge instructions. 5. Did staff discuss safe sleep during your stay? Y/N   6. Did we explain things in a way you could understand? Y/N  7. Were we respectful of your preferences for labor and birth and include you in the plan of       care? Y/N  If no, please explain _______________________________________________  8. Is there anyone in particular you would like to mention who provided care for you? _______      _________________________________________________________________________     9. Were you given a Post-Birth Warning Signs handout? Y/N  Do you have it somewhere      easily accessible? Y/N  If no, please send them a copy and ask them to put it somewhere      easily found. 10. Have you been crying excessively, having anger or mood swings that feel out of control, or       feel like you can't cope with caring for yourself or baby? Y/N   If yes, they may be showing       signs of postpartum depression and should call provider.  There is also a        depression test on page C5 in their discharge booklet they can take. 13. Do you have any other questions or concerns I can address today?  Y/N  ______________      _________________________________________________________________________    Information provided during call :_________________________________________________  ___________________________________________________________________________    Call completed by:____________________________    Date:_________ Time:___________

## 2022-08-17 NOTE — PROGRESS NOTES
This RN notified Dr. Maite Moralez at this time about patient pulse of 121. Dr. Maite Moralez states okay, no new orders received at this time.

## 2022-08-17 NOTE — LACTATION NOTE
This note was copied from a baby's chart. Lactation Progress Note      Data:    F/U on primip breast feeder. Mob states that baby is latching very well to the right breast but not to the left. Output established and weight loss is WNL     Action: Explained that often babies will prefer one breast over the other possibly due to positioning or shape and elasticity of nipple. Assisted with good position at left breast. Encouraged mob to express colostrum to entice feed. Baby rooting and a good deep comfortable latch was achieved with SRS and AS. Reviewed importance of offering good support to breast and baby. Breast feeding education reviewed and encouraged mob to call for f/u assistance until she is more comfortable latching independently. [address] [facility] number on board. Response: Pleased with breast feed. Verbalized and demonstrated understanding. Will call for f/u as needed.